# Patient Record
Sex: FEMALE | Race: WHITE | NOT HISPANIC OR LATINO
[De-identification: names, ages, dates, MRNs, and addresses within clinical notes are randomized per-mention and may not be internally consistent; named-entity substitution may affect disease eponyms.]

---

## 2022-06-06 PROBLEM — Z00.00 ENCOUNTER FOR PREVENTIVE HEALTH EXAMINATION: Status: ACTIVE | Noted: 2022-06-06

## 2022-06-07 ENCOUNTER — APPOINTMENT (OUTPATIENT)
Dept: ORTHOPEDIC SURGERY | Facility: CLINIC | Age: 47
End: 2022-06-07
Payer: COMMERCIAL

## 2022-06-07 VITALS — HEIGHT: 63 IN | BODY MASS INDEX: 26.58 KG/M2 | WEIGHT: 150 LBS

## 2022-06-07 DIAGNOSIS — Z78.9 OTHER SPECIFIED HEALTH STATUS: ICD-10-CM

## 2022-06-07 PROCEDURE — 73130 X-RAY EXAM OF HAND: CPT | Mod: RT

## 2022-06-07 PROCEDURE — 99203 OFFICE O/P NEW LOW 30 MIN: CPT

## 2022-06-07 RX ORDER — MELOXICAM 15 MG/1
15 TABLET ORAL
Qty: 30 | Refills: 2 | Status: ACTIVE | COMMUNITY
Start: 2022-06-07 | End: 1900-01-01

## 2022-06-07 NOTE — IMAGING
[de-identified] : RIGHT HAND\par skin intact. no swelling.\par no TTP.\par good wrist extension, flexion. good pronation, supination. \par good EPL, FPL. good finger extension, flex to full fist. good finger abduction and adduction. \par SILT to median, ulnar, radial distribution. \par brisk cap refill all digits.\par no triggering.\par \par no pain with wrist flexion, extension. no pain with pronation, supination.\par DRUJ shear => pain @ supination >> pronation. no instability.\par negative [facility] synergy test. [Right] : right hand [FreeTextEntry1] : no acute displaced fracture or dislocation.

## 2022-06-07 NOTE — ASSESSMENT
[FreeTextEntry1] : The condition was explained to the patient.\par - recommend wrist brace, full time except hygiene x 4wks.\par - prescribed Meloxicam QD x 2wks, then PRN.\par - activity modification, ice PRN.\par \par F/u 4wks.

## 2022-06-07 NOTE — HISTORY OF PRESENT ILLNESS
[Gradual] : gradual [6] : 6 [2] : 2 [Dull/Aching] : dull/aching [Intermittent] : intermittent [Rest] : rest [de-identified] : 6/7/22: 48yo RHD F () presents for RIGHT ulnar hand pain x 2mo. Pain occurs with cooking, cutting. She had similar pain a couple years ago when she did a lot of kneading, but the pain resolved when she stopped kneading. Wakes her from sleep. Denies numbness/tingling.\par Taking Advil PRN.\par \par Hx: none. [] : no [FreeTextEntry1] : right hand and wrist [de-identified] : activitiee

## 2022-07-05 ENCOUNTER — APPOINTMENT (OUTPATIENT)
Dept: ORTHOPEDIC SURGERY | Facility: CLINIC | Age: 47
End: 2022-07-05

## 2022-07-05 DIAGNOSIS — S69.81XA OTHER SPECIFIED INJURIES OF RIGHT WRIST, HAND AND FINGER(S), INITIAL ENCOUNTER: ICD-10-CM

## 2022-07-05 PROCEDURE — 99213 OFFICE O/P EST LOW 20 MIN: CPT

## 2022-07-05 NOTE — IMAGING
[Right] : right hand [FreeTextEntry1] : no acute displaced fracture or dislocation. [de-identified] : RIGHT HAND\par skin intact. no swelling.\par no TTP.\par good wrist extension, flexion. good pronation, supination. \par good EPL, FPL. good finger extension, flex to full fist. good finger abduction and adduction. \par SILT to median, ulnar, radial distribution. \par brisk cap refill all digits.\par no triggering.\par \par no pain with wrist flexion, extension. no pain with pronation, supination.\par DRUJ shear => min pain @ supination. no instability.\par negative [facility] synergy test.

## 2022-07-05 NOTE — HISTORY OF PRESENT ILLNESS
[Gradual] : gradual [6] : 6 [2] : 2 [Dull/Aching] : dull/aching [Intermittent] : intermittent [Rest] : rest [de-identified] : 7/5/22: f/u RIGHT [facility] injury. wearing wrist brace most of the day and night, took it off for beach yesterday, felt soreness at the end of the day. overall feeling better.\par Taking Meloxicam PRN.\par \par 6/7/22: 46yo RHD F () presents for RIGHT ulnar hand pain x 2mo. Pain occurs with cooking, cutting. She had similar pain a couple years ago when she did a lot of kneading, but the pain resolved when she stopped kneading. Wakes her from sleep. Denies numbness/tingling.\par Taking Advil PRN.\par \par Hx: none. [] : no [FreeTextEntry5] : Pt here for RT hand and wrist , pt states her hand has improved with brace but when she does not  wear it, towards the end of the day  site starts to feel sore  [FreeTextEntry1] : right hand and wrist [de-identified] : activitiee

## 2022-07-05 NOTE — ASSESSMENT
[FreeTextEntry1] : - wear wrist brace for lifting activity and sleep x 2wks, then transition to wrist widget for daytime activity and wrist brace for sleep.\par - prescribed OT for R wrist.\par - activity modification, ice PRN.\par \par F/u 4-6wks.

## 2022-08-16 ENCOUNTER — APPOINTMENT (OUTPATIENT)
Dept: ORTHOPEDIC SURGERY | Facility: CLINIC | Age: 47
End: 2022-08-16